# Patient Record
Sex: FEMALE | Race: WHITE | URBAN - METROPOLITAN AREA
[De-identification: names, ages, dates, MRNs, and addresses within clinical notes are randomized per-mention and may not be internally consistent; named-entity substitution may affect disease eponyms.]

---

## 2017-02-21 ENCOUNTER — ANESTHESIA (OUTPATIENT)
Dept: SURGERY | Facility: CLINIC | Age: 62
End: 2017-02-21
Payer: COMMERCIAL

## 2017-02-21 ENCOUNTER — ANESTHESIA EVENT (OUTPATIENT)
Dept: SURGERY | Facility: CLINIC | Age: 62
End: 2017-02-21
Payer: COMMERCIAL

## 2017-02-21 PROCEDURE — 25000128 H RX IP 250 OP 636: Performed by: NURSE ANESTHETIST, CERTIFIED REGISTERED

## 2017-02-21 PROCEDURE — 25000125 ZZHC RX 250: Performed by: NURSE ANESTHETIST, CERTIFIED REGISTERED

## 2017-02-21 RX ORDER — PROPOFOL 10 MG/ML
INJECTION, EMULSION INTRAVENOUS PRN
Status: DISCONTINUED | OUTPATIENT
Start: 2017-02-21 | End: 2017-02-21

## 2017-02-21 RX ORDER — ONDANSETRON 2 MG/ML
INJECTION INTRAMUSCULAR; INTRAVENOUS PRN
Status: DISCONTINUED | OUTPATIENT
Start: 2017-02-21 | End: 2017-02-21

## 2017-02-21 RX ORDER — FENTANYL CITRATE 50 UG/ML
INJECTION, SOLUTION INTRAMUSCULAR; INTRAVENOUS PRN
Status: DISCONTINUED | OUTPATIENT
Start: 2017-02-21 | End: 2017-02-21

## 2017-02-21 RX ORDER — EPHEDRINE SULFATE 50 MG/ML
INJECTION, SOLUTION INTRAMUSCULAR; INTRAVENOUS; SUBCUTANEOUS PRN
Status: DISCONTINUED | OUTPATIENT
Start: 2017-02-21 | End: 2017-02-21

## 2017-02-21 RX ADMIN — PROPOFOL 20 MG: 10 INJECTION, EMULSION INTRAVENOUS at 14:14

## 2017-02-21 RX ADMIN — PROPOFOL 20 MG: 10 INJECTION, EMULSION INTRAVENOUS at 14:13

## 2017-02-21 RX ADMIN — Medication 8 MCG: at 14:02

## 2017-02-21 RX ADMIN — PHENYLEPHRINE HYDROCHLORIDE 50 MCG: 10 INJECTION, SOLUTION INTRAMUSCULAR; INTRAVENOUS; SUBCUTANEOUS at 14:23

## 2017-02-21 RX ADMIN — PHENYLEPHRINE HYDROCHLORIDE 100 MCG: 10 INJECTION, SOLUTION INTRAMUSCULAR; INTRAVENOUS; SUBCUTANEOUS at 14:27

## 2017-02-21 RX ADMIN — Medication 8 MCG: at 14:14

## 2017-02-21 RX ADMIN — PHENYLEPHRINE HYDROCHLORIDE 100 MCG: 10 INJECTION, SOLUTION INTRAMUSCULAR; INTRAVENOUS; SUBCUTANEOUS at 14:37

## 2017-02-21 RX ADMIN — Medication 5 MG: at 14:30

## 2017-02-21 RX ADMIN — PROPOFOL 30 MG: 10 INJECTION, EMULSION INTRAVENOUS at 14:12

## 2017-02-21 RX ADMIN — MIDAZOLAM HYDROCHLORIDE 1 MG: 1 INJECTION, SOLUTION INTRAMUSCULAR; INTRAVENOUS at 13:58

## 2017-02-21 RX ADMIN — ONDANSETRON 4 MG: 2 INJECTION INTRAMUSCULAR; INTRAVENOUS at 14:00

## 2017-02-21 RX ADMIN — FENTANYL CITRATE 25 MCG: 50 INJECTION, SOLUTION INTRAMUSCULAR; INTRAVENOUS at 14:07

## 2017-02-21 RX ADMIN — Medication 4 MCG: at 14:11

## 2017-02-21 RX ADMIN — Medication 5 MG: at 14:12

## 2017-02-21 RX ADMIN — PHENYLEPHRINE HYDROCHLORIDE 50 MCG: 10 INJECTION, SOLUTION INTRAMUSCULAR; INTRAVENOUS; SUBCUTANEOUS at 14:20

## 2017-02-21 ASSESSMENT — LIFESTYLE VARIABLES: TOBACCO_USE: 1

## 2017-02-21 NOTE — ANESTHESIA PREPROCEDURE EVALUATION
Anesthesia Evaluation     .        ROS/MED HX    ENT/Pulmonary:     (+)tobacco use, Past use , recent URI resolved . .   (-) sleep apnea   Neurologic:  - neg neurologic ROS     Cardiovascular:     (+) Dyslipidemia, ----. : . . . :. .       METS/Exercise Tolerance:     Hematologic:  - neg hematologic  ROS       Musculoskeletal:  - neg musculoskeletal ROS       GI/Hepatic:        (-) GERD   Renal/Genitourinary:  - ROS Renal section negative       Endo:  - neg endo ROS       Psychiatric:  - neg psychiatric ROS       Infectious Disease:  - neg infectious disease ROS       Malignancy:         Other:               Physical Exam  Normal systems: cardiovascular, pulmonary and dental    Airway   Mallampati: II  TM distance: >3 FB  Neck ROM: full    Dental     Cardiovascular       Pulmonary                     Anesthesia Plan      History & Physical Review  History and physical reviewed and following examination; no interval change.    ASA Status:  1 .    NPO Status:  > 8 hours    Plan for MAC Reason for MAC:  Procedure to face, neck, head or breast  PONV prophylaxis:  Ondansetron (or other 5HT-3)       Postoperative Care  Postoperative pain management:  IV analgesics.      Consents  Anesthetic plan, risks, benefits and alternatives discussed with:  Patient..        Procedure: Procedure(s):  BLEPHAROPLASTY BILATERAL  Preop diagnosis: BILATERAL DERMATOCHALASIS    No Known Allergies  Past Medical History   Diagnosis Date     Hyperlipidemia      Lyme disease      probable treated     Vitamin D deficiency      Past Surgical History   Procedure Laterality Date     Orthopedic surgery       2001., 2007 bone spurs from each great toe     Ent surgery       tonsillectomy as a child     Soft tissue surgery       excess tissue leg/ankle     Prior to Admission medications    Medication Sig Start Date End Date Taking? Authorizing Provider   ASPIRIN PO Take 81 mg by mouth daily   Yes Reported, Patient   Cholecalciferol (VITAMIN D-3 PO)  Take 1,000 Units by mouth   Yes Reported, Patient   Simvastatin (ZOCOR PO) Take 10 mg by mouth At Bedtime   Yes Reported, Patient     Current Facility-Administered Medications Ordered in Epic   Medication Dose Route Frequency Last Rate Last Dose     lidocaine 1 % 1 mL  1 mL Other Q1H PRN         lactated ringers infusion   Intravenous Continuous         No current [facility]-ordered outpatient prescriptions on file.     Wt Readings from Last 1 Encounters:   02/20/17 82 kg (180 lb 12.8 oz)     Temp Readings from Last 1 Encounters:   No data found for Temp     BP Readings from Last 6 Encounters:   No data found for BP     Pulse Readings from Last 4 Encounters:   No data found for Pulse     Resp Readings from Last 1 Encounters:   No data found for Resp     SpO2 Readings from Last 1 Encounters:   No data found for SpO2     No results for input(s): NA, POTASSIUM, CHLORIDE, CO2, ANIONGAP, GLC, BUN, CR, BRENDA in the last 49143 hours.  No results for input(s): WBC, HGB, PLT in the last 35348 hours.  No results for input(s): INR in the last 22871 hours.    Invalid input(s): APTT   RECENT LABS:   ECG:   ECHO:   CXR:                      .

## 2017-02-21 NOTE — ANESTHESIA CARE TRANSFER NOTE
Patient: Mai Drew    Procedure(s):  BILATERAL UPPER EYE LID BLEPHAROPLSTY   - Wound Class: I-Clean    Diagnosis: BILATERAL DERMATOCHALASIS  Diagnosis Additional Information: No value filed.    Anesthesia Type:   MAC     Note:  Airway :Room Air  Patient transferred to:PACU  Comments: VSS. Airway and IV patent. Patient comfortable. Report to RN. Stable care transfer.      Vitals: (Last set prior to Anesthesia Care Transfer)    CRNA VITALS  2/21/2017 1425 - 2/21/2017 1455      2/21/2017             Resp Rate (set): 10                Electronically Signed By: LUBA Luna CRNA  February 21, 2017  2:55 PM

## 2017-02-21 NOTE — ANESTHESIA POSTPROCEDURE EVALUATION
Patient: Mai Drew    Procedure(s):  BILATERAL UPPER EYE LID BLEPHAROPLSTY   - Wound Class: I-Clean    Diagnosis:BILATERAL DERMATOCHALASIS  Diagnosis Additional Information: No value filed.    Anesthesia Type:  MAC    Note:  Anesthesia Post Evaluation    Patient location during evaluation: bedside  Patient participation: Able to fully participate in evaluation  Level of consciousness: awake  Pain management: adequate  Airway patency: patent  Cardiovascular status: acceptable  Respiratory status: acceptable  Hydration status: acceptable  PONV: none     Anesthetic complications: None          Last vitals:  Vitals:    02/21/17 1239 02/21/17 1457 02/21/17 1503   BP: 107/68 105/63 106/65   Pulse: 60     Resp: 18 16 16   Temp: 36.7  C (98  F)     SpO2: 97% 94%          Electronically Signed By: Estevan Calabrese DO, DO  February 21, 2017  3:59 PM